# Patient Record
Sex: FEMALE | Race: WHITE | Employment: UNEMPLOYED | URBAN - METROPOLITAN AREA
[De-identification: names, ages, dates, MRNs, and addresses within clinical notes are randomized per-mention and may not be internally consistent; named-entity substitution may affect disease eponyms.]

---

## 2023-08-24 ENCOUNTER — HOSPITAL ENCOUNTER (EMERGENCY)
Age: 23
Discharge: HOME | End: 2023-08-24
Attending: EMERGENCY MEDICINE
Payer: COMMERCIAL

## 2023-08-24 VITALS
WEIGHT: 230 LBS | HEIGHT: 70 IN | DIASTOLIC BLOOD PRESSURE: 66 MMHG | SYSTOLIC BLOOD PRESSURE: 115 MMHG | TEMPERATURE: 98.2 F | HEART RATE: 63 BPM | BODY MASS INDEX: 32.93 KG/M2 | OXYGEN SATURATION: 98 % | RESPIRATION RATE: 16 BRPM

## 2023-08-24 DIAGNOSIS — K02.9 DENTAL CARIES: ICD-10-CM

## 2023-08-24 DIAGNOSIS — K08.89 PAIN, DENTAL: Primary | ICD-10-CM

## 2023-08-24 PROCEDURE — 99283 EMERGENCY DEPT VISIT LOW MDM: CPT

## 2023-08-24 RX ORDER — NAPROXEN 500 MG/1
500 TABLET ORAL 2 TIMES DAILY
Qty: 60 TABLET | Refills: 0 | Status: SHIPPED | OUTPATIENT
Start: 2023-08-24

## 2023-08-24 RX ORDER — AMOXICILLIN AND CLAVULANATE POTASSIUM 875; 125 MG/1; MG/1
1 TABLET, FILM COATED ORAL 2 TIMES DAILY
Qty: 20 TABLET | Refills: 0 | Status: SHIPPED | OUTPATIENT
Start: 2023-08-24 | End: 2023-09-03

## 2023-08-24 RX ORDER — ACETAMINOPHEN 325 MG/1
650 TABLET ORAL EVERY 6 HOURS PRN
Qty: 120 TABLET | Refills: 3 | Status: SHIPPED | OUTPATIENT
Start: 2023-08-24

## 2023-08-24 ASSESSMENT — PAIN DESCRIPTION - ORIENTATION: ORIENTATION: LEFT

## 2023-08-24 ASSESSMENT — PAIN DESCRIPTION - DESCRIPTORS: DESCRIPTORS: SHOOTING;ACHING

## 2023-08-24 ASSESSMENT — PAIN SCALES - GENERAL: PAINLEVEL_OUTOF10: 8

## 2023-08-24 ASSESSMENT — PAIN DESCRIPTION - LOCATION: LOCATION: TEETH

## 2023-08-24 ASSESSMENT — PAIN - FUNCTIONAL ASSESSMENT: PAIN_FUNCTIONAL_ASSESSMENT: 0-10

## 2023-08-24 ASSESSMENT — PAIN DESCRIPTION - PAIN TYPE: TYPE: ACUTE PAIN

## 2023-08-24 NOTE — ED PROVIDER NOTES
[address] [place] [address]      Triage Chief Complaint:   Dental Pain (Lower left)    [place]:  Kevin Marcos is a 23 y.o. female that presents with complaint of left lower dental pain. Patient states for the last week she has had left lower dental pain fevers. Pain is sharp and shooting. Hot and cold liquids make it worse. She has not seen a dentist in 3 years. She has been able to get into a dentist because of losing her medical ID card, no photo ID, lost her Social Security card. Mother is here with her try to get in the process of getting that so they can get to a dentist but had not been to the dentist.  She has had some fevers of 101 she states and slight headache. No nausea vomiting no pregnancy no chest pain shortness of breath no choking gagging drooling no trauma. No other questions or concerns she has been taking Naprosyn Tylenol without relief. ROS:  General:  fevers  Eyes:  no discharge  ENT:  No sore throat, no nasal congestion, no hearing changes, + dental pain  Cardiovascular:  No chest pain  Respiratory:  no cough  Gastrointestinal:  no nausea, no vomiting  Musculoskeletal:   no joint pain  Skin:  No rash  Neurologic:  No speech problems, no headache  Genitourinary:  No dysuria    No past medical history on file. No past surgical history on file. No family history on file.   Social History     Socioeconomic History    Marital status: Not on file     Spouse name: Not on file    Number of children: Not on file    Years of education: Not on file    Highest education level: Not on file   Occupational History    Not on file   Tobacco Use    Smoking status: Not on file    Smokeless tobacco: Not on file   Substance and Sexual Activity    Alcohol use: Not Currently    Drug use: Not on file    Sexual activity: Not on file   Other Topics Concern    Not on file   Social History Narrative    Not on file     Social Determinants of Health     Financial Resource Strain: Not on file   Food Insecurity: Not on file require drainage in the future, currently there are no drainable sites, patient has no evidence of sepsis. The patient will be given antibiotics, pain medications, and dental clinic/office list provided. I stressed the need for dental followup as emergency department treatment is not the definitive treatment of choice. Patient understands and agrees with the plan, outpatient follow up instructions given, return warnings given. Again patient states that she has had this for the past week has had fevers and headache and sharp shooting pain hot and cold liquids make it worse. She has not been to a dentist in 3 years she lost all forms of photo identification and her Social Security card so she has been unable to get to a dentist.  Patient appears otherwise well no fever here she has no stridor no drooling no angioedema no tonsillar exudate or swelling no facial swelling no signs of Remington's angina. No tender cervical adenopathy. Moves neck freely. Again afebrile. She has been taking Naprosyn Tylenol without relief. This time I did give her Augmentin Orajel Naprosyn Tylenol for home I did stressed need for follow-up with a dentist at this time I do not see any signs of dental abscess for me to drain or other concerning signs I do not think she has cavernous venous sinus stenosis. She appears otherwise well discharged with dental resources given return precautions and follow-up information. Clinical Impression:  1. Pain, dental    2.  Dental caries      Disposition referral (if applicable):  Fernando Wong   [phone]    If symptoms worsen    Kolton Phelps MD  [address] [place] [address]  [phone]          Disposition medications (if applicable):  New Prescriptions    ACETAMINOPHEN (TYLENOL) 325 MG TABLET    Take 2 tablets by mouth every 6 hours as needed for Pain    AMOXICILLIN-CLAVULANATE (AUGMENTIN) 875-125 MG PER TABLET

## 2024-01-07 ENCOUNTER — HOSPITAL ENCOUNTER (EMERGENCY)
Age: 24
Discharge: HOME | End: 2024-01-07
Attending: STUDENT IN AN ORGANIZED HEALTH CARE EDUCATION/TRAINING PROGRAM

## 2024-01-07 VITALS
OXYGEN SATURATION: 98 % | HEART RATE: 67 BPM | RESPIRATION RATE: 16 BRPM | DIASTOLIC BLOOD PRESSURE: 78 MMHG | WEIGHT: 230 LBS | TEMPERATURE: 98.2 F | SYSTOLIC BLOOD PRESSURE: 132 MMHG | BODY MASS INDEX: 32.93 KG/M2 | HEIGHT: 70 IN

## 2024-01-07 DIAGNOSIS — J40 BRONCHITIS: Primary | ICD-10-CM

## 2024-01-07 PROCEDURE — 99283 EMERGENCY DEPT VISIT LOW MDM: CPT

## 2024-01-07 RX ORDER — IBUPROFEN 200 MG
200 TABLET ORAL EVERY 6 HOURS PRN
COMMUNITY

## 2024-01-07 RX ORDER — BENZONATATE 100 MG/1
100-200 CAPSULE ORAL 3 TIMES DAILY PRN
Qty: 30 CAPSULE | Refills: 0 | Status: SHIPPED | OUTPATIENT
Start: 2024-01-07 | End: 2024-01-12

## 2024-01-07 ASSESSMENT — ENCOUNTER SYMPTOMS
SHORTNESS OF BREATH: 0
COUGH: 1
VOMITING: 0
NAUSEA: 0
SORE THROAT: 0
ABDOMINAL PAIN: 0

## 2024-01-07 ASSESSMENT — PAIN SCALES - GENERAL: PAINLEVEL_OUTOF10: 6

## 2024-01-07 ASSESSMENT — PAIN DESCRIPTION - DESCRIPTORS: DESCRIPTORS: ACHING

## 2024-01-07 ASSESSMENT — PAIN DESCRIPTION - LOCATION: LOCATION: GENERALIZED

## 2024-01-07 ASSESSMENT — PAIN - FUNCTIONAL ASSESSMENT: PAIN_FUNCTIONAL_ASSESSMENT: 0-10

## 2024-01-07 NOTE — DISCHARGE INSTRUCTIONS
You are seen here today for cough and nasal congestion.  Your symptoms are consistent with viral bronchitis.  Recommend taking Tessalon Perles or over-the-counter cough suppressants.  Use Tylenol and Motrin as needed for pain.  If your cough lingers for more than 2 weeks consider following up with her primary care physician to discuss antibiotics.  Antibiotics currently are not indicated.    Return the emergency department for worsening shortness of breath, high fevers, chest pain, worsening cough or any other new worsening or concerning complaints.    Please go to [facility] or call [phone] to find a new provider.     Or use QR code below:

## 2024-01-07 NOTE — ED NOTES
Pt verbalized understanding of discharge medication/side effects and follow-up care/instructions, denies any questions or concerns at this time. Prescription sent to requested pharmacy; pt encouraged to return to the ED for any new or worsening symptoms.

## 2024-01-07 NOTE — ED PROVIDER NOTES
[facility]  Emergency Department Encounter    Pt Name:Eugenia Devi  MRN: [id number]  Birthdate 2000  Date of evaluation: 1/7/24  PCP:  No primary care provider on file.       CHIEF COMPLAINT       Chief Complaint   Patient presents with    Cough       HISTORY OF PRESENT ILLNESS     Eugenia Devi is a 23 y.o. female who presents with nasal congestion and cough.  Patient states that \"someone \"brought home an illness from work.  Afterwards patient developed nasal congestion and a mostly dry cough.  She occasionally has clear sputum production.  Her main concern today is symptomatic treatment of her cough.    She denies any fever or chills.  No sore throat.  No abdominal pain nausea vomiting dysuria frequency.    Patient states she has no past medical history other than getting bronchitis a couple times a year.  She does not have a PCP.    PAST MEDICAL / SURGICAL / SOCIAL / FAMILY HISTORY      has no past medical history on file.       has no past surgical history on file.      Social History     Socioeconomic History    Marital status: Single     Spouse name: Not on file    Number of children: Not on file    Years of education: Not on file    Highest education level: Not on file   Occupational History    Not on file   Tobacco Use    Smoking status: Never    Smokeless tobacco: Never   Vaping Use    Vaping Use: Former   Substance and Sexual Activity    Alcohol use: Not Currently    Drug use: Never    Sexual activity: Not on file   Other Topics Concern    Not on file   Social History Narrative    Not on file     Social Determinants of Health     Financial Resource Strain: Not on file   Food Insecurity: Not on file   Transportation Needs: Not on file   Physical Activity: Not on file   Stress: Not on file   Social Connections: Not on file   Intimate Partner Violence: Not on file   Housing Stability: Not on file       History reviewed. No pertinent family history.    Allergies: